# Patient Record
Sex: MALE | Race: WHITE | NOT HISPANIC OR LATINO | Employment: FULL TIME | URBAN - NONMETROPOLITAN AREA
[De-identification: names, ages, dates, MRNs, and addresses within clinical notes are randomized per-mention and may not be internally consistent; named-entity substitution may affect disease eponyms.]

---

## 2023-02-28 PROBLEM — L70.0 ACNE VULGARIS: Status: ACTIVE | Noted: 2023-02-28

## 2023-02-28 PROBLEM — L03.119 CELLULITIS OF LEG: Status: ACTIVE | Noted: 2023-02-28

## 2023-02-28 RX ORDER — DOXYCYCLINE 100 MG/1
1 CAPSULE ORAL DAILY
COMMUNITY
Start: 2012-10-18 | End: 2023-03-22 | Stop reason: ALTCHOICE

## 2023-02-28 RX ORDER — TRETINOIN 0.25 MG/G
CREAM TOPICAL
COMMUNITY
Start: 2012-08-26 | End: 2023-03-22 | Stop reason: ALTCHOICE

## 2023-02-28 RX ORDER — SULFAMETHOXAZOLE AND TRIMETHOPRIM 800; 160 MG/1; MG/1
1 TABLET ORAL 2 TIMES DAILY
COMMUNITY
Start: 2013-06-06 | End: 2023-03-22 | Stop reason: ALTCHOICE

## 2023-03-14 NOTE — PROGRESS NOTES
Subjective   Patient ID:   Calderon Vicente is a 27 y.o. male who presents for New Patient Visit.  [facility]  New patient here today to establish care with myself.  Last PCP: N/A  Last seen: N/A  No significant PMH.  Not currently on medications.    Elevated BP:  BP is a little up today at 139/84.  No history of HTN.  States he is a little nervous.    Varicocele:  Was in the ED back in Feb 2023 for this.  It has since resolved.    Health maintenance:  Smoking: Current smoker.   Labs: DUE  Influenza:    Review of Systems  12 point review of systems negative unless stated above in HPI    Vitals:    03/22/23 0810   BP: 139/84   Pulse: 99   SpO2: 95%       Physical Exam  General: Alert and oriented, well nourished, no acute distress.  Lungs: Clear to auscultation, non-labored respiration.  Heart: Normal rate, regular rhythm, no murmur, gallop or edema.  Neurologic: Awake, alert, and oriented X3, CN II-XII intact.  Psychiatric: Cooperative, appropriate mood and affect.    Assessment/Plan   It was nice meeting you today!  I have ordered some labs to be done as soon as you can.  We will call you with the results.  We will continue to monitor the BP, but it appears to be high due to anxiety today.  Consider checking at home.  Previous Bps reviewed and look good.  Call with questions or concerns.    Fu yearly and as needed  Diagnoses and all orders for this visit:  Elevated blood pressure reading  -     Comprehensive Metabolic Panel; Future  -     Lipid Panel; Future  -     CBC and Auto Differential; Future  Varicocele

## 2023-03-20 ENCOUNTER — APPOINTMENT (OUTPATIENT)
Dept: PRIMARY CARE | Facility: CLINIC | Age: 28
End: 2023-03-20
Payer: COMMERCIAL

## 2023-03-22 ENCOUNTER — OFFICE VISIT (OUTPATIENT)
Dept: PRIMARY CARE | Facility: CLINIC | Age: 28
End: 2023-03-22
Payer: COMMERCIAL

## 2023-03-22 VITALS
HEART RATE: 99 BPM | SYSTOLIC BLOOD PRESSURE: 139 MMHG | BODY MASS INDEX: 37.05 KG/M2 | OXYGEN SATURATION: 95 % | HEIGHT: 70 IN | DIASTOLIC BLOOD PRESSURE: 84 MMHG | WEIGHT: 258.8 LBS

## 2023-03-22 DIAGNOSIS — R03.0 ELEVATED BLOOD PRESSURE READING: Primary | ICD-10-CM

## 2023-03-22 DIAGNOSIS — I86.1 VARICOCELE: ICD-10-CM

## 2023-03-22 PROCEDURE — 99203 OFFICE O/P NEW LOW 30 MIN: CPT | Performed by: PHYSICIAN ASSISTANT

## 2023-04-13 NOTE — PROGRESS NOTES
Subjective   Patient ID:   Calderon Vicente is a 27 y.o. male who presents for Migraine, Fatigue, and Dizziness.  HPI  Elevated BP:  BP was high last visit - he was nervous for the appointment  No history of HTN.  BP today is 137/91.  He does get dizziness and headaches at times.  Does not check at home.    Fatigue:  Having increased tiredness.  Feels light headed/dizzy at times.  No specific trigger.    Varicocele:  Was in the ED back in Feb 2023 for this.  It has since resolved.    Health maintenance:  Smoking: Current smoker.   Labs: DUE  Influenza:    Review of Systems  12 point review of systems negative unless stated above in HPI    Vitals:    04/17/23 1353   BP: (!) 137/91   Pulse: 70   SpO2: 97%       Physical Exam  General: Alert and oriented, well nourished, no acute distress.  Lungs: Clear to auscultation, non-labored respiration.  Heart: Normal rate, regular rhythm, no murmur, gallop or edema.  Neurologic: Awake, alert, and oriented X3, CN II-XII intact.  Psychiatric: Cooperative, appropriate mood and affect.    Assessment/Plan   I have ordered some labs to be done as soon as you can.  We will call you with the results.  These will evaluate causes of fatigue.  Consider a sleep study.  I do believe your dizziness/headaches are related to elevated BP.  I have sent in low dose Lisinopril to start.  Consider monitoring BP at home.  Continue with smoking cessation.  If symptoms persist or worsen despite current plan of care, please contact your healthcare provider for further evaluation.  Patient instructed to contact the office if there are any questions regarding their care or treatment.   [facility] [phone].  [facility] [phone].    Fu 1 month for BP  Diagnoses and all orders for this visit:  Primary hypertension  -     lisinopril 2.5 mg tablet; Take 1 tablet (2.5 mg) by mouth once daily.  Fatigue, unspecified type  -     Comprehensive Metabolic Panel; Future  -      Lipid Panel; Future  -     CBC and Auto Differential; Future  -     Vitamin D 1,25 Dihydroxy; Future  -     Vitamin B12; Future  -     TSH with reflex to Free T4 if abnormal; Future  -     Hemoglobin A1C; Future  Light headed  -     Hemoglobin A1C; Future  Nonintractable episodic headache, unspecified headache type

## 2023-04-17 ENCOUNTER — OFFICE VISIT (OUTPATIENT)
Dept: PRIMARY CARE | Facility: CLINIC | Age: 28
End: 2023-04-17
Payer: COMMERCIAL

## 2023-04-17 VITALS
DIASTOLIC BLOOD PRESSURE: 91 MMHG | BODY MASS INDEX: 37.13 KG/M2 | HEIGHT: 70 IN | HEART RATE: 70 BPM | OXYGEN SATURATION: 97 % | WEIGHT: 259.38 LBS | SYSTOLIC BLOOD PRESSURE: 137 MMHG

## 2023-04-17 DIAGNOSIS — R53.83 FATIGUE, UNSPECIFIED TYPE: ICD-10-CM

## 2023-04-17 DIAGNOSIS — R42 LIGHT HEADED: ICD-10-CM

## 2023-04-17 DIAGNOSIS — R51.9 NONINTRACTABLE EPISODIC HEADACHE, UNSPECIFIED HEADACHE TYPE: ICD-10-CM

## 2023-04-17 DIAGNOSIS — I10 PRIMARY HYPERTENSION: Primary | ICD-10-CM

## 2023-04-17 PROCEDURE — 3075F SYST BP GE 130 - 139MM HG: CPT | Performed by: PHYSICIAN ASSISTANT

## 2023-04-17 PROCEDURE — 3080F DIAST BP >= 90 MM HG: CPT | Performed by: PHYSICIAN ASSISTANT

## 2023-04-17 PROCEDURE — 99214 OFFICE O/P EST MOD 30 MIN: CPT | Performed by: PHYSICIAN ASSISTANT

## 2023-04-17 RX ORDER — LISINOPRIL 2.5 MG/1
2.5 TABLET ORAL DAILY
Qty: 30 TABLET | Refills: 1 | Status: SHIPPED | OUTPATIENT
Start: 2023-04-17 | End: 2023-04-18 | Stop reason: SDUPTHER

## 2023-04-18 ENCOUNTER — LAB (OUTPATIENT)
Dept: LAB | Facility: LAB | Age: 28
End: 2023-04-18
Payer: COMMERCIAL

## 2023-04-18 DIAGNOSIS — R42 LIGHT HEADED: ICD-10-CM

## 2023-04-18 DIAGNOSIS — I10 PRIMARY HYPERTENSION: ICD-10-CM

## 2023-04-18 DIAGNOSIS — R53.83 FATIGUE, UNSPECIFIED TYPE: ICD-10-CM

## 2023-04-18 LAB
ALANINE AMINOTRANSFERASE (SGPT) (U/L) IN SER/PLAS: 41 U/L (ref 10–52)
ALBUMIN (G/DL) IN SER/PLAS: 4.4 G/DL (ref 3.4–5)
ALKALINE PHOSPHATASE (U/L) IN SER/PLAS: 48 U/L (ref 33–120)
ANION GAP IN SER/PLAS: 13 MMOL/L (ref 10–20)
ASPARTATE AMINOTRANSFERASE (SGOT) (U/L) IN SER/PLAS: 25 U/L (ref 9–39)
BASOPHILS (10*3/UL) IN BLOOD BY AUTOMATED COUNT: 0.06 X10E9/L (ref 0–0.1)
BASOPHILS/100 LEUKOCYTES IN BLOOD BY AUTOMATED COUNT: 0.9 % (ref 0–2)
BILIRUBIN TOTAL (MG/DL) IN SER/PLAS: 0.6 MG/DL (ref 0–1.2)
CALCIUM (MG/DL) IN SER/PLAS: 9.6 MG/DL (ref 8.6–10.3)
CARBON DIOXIDE, TOTAL (MMOL/L) IN SER/PLAS: 30 MMOL/L (ref 21–32)
CHLORIDE (MMOL/L) IN SER/PLAS: 101 MMOL/L (ref 98–107)
CHOLESTEROL (MG/DL) IN SER/PLAS: 139 MG/DL (ref 0–199)
CHOLESTEROL IN HDL (MG/DL) IN SER/PLAS: 34.1 MG/DL
CHOLESTEROL/HDL RATIO: 4.1
CREATININE (MG/DL) IN SER/PLAS: 0.87 MG/DL (ref 0.5–1.3)
EOSINOPHILS (10*3/UL) IN BLOOD BY AUTOMATED COUNT: 0.33 X10E9/L (ref 0–0.7)
EOSINOPHILS/100 LEUKOCYTES IN BLOOD BY AUTOMATED COUNT: 4.8 % (ref 0–6)
ERYTHROCYTE DISTRIBUTION WIDTH (RATIO) BY AUTOMATED COUNT: 11.9 % (ref 11.5–14.5)
ERYTHROCYTE MEAN CORPUSCULAR HEMOGLOBIN CONCENTRATION (G/DL) BY AUTOMATED: 34.1 G/DL (ref 32–36)
ERYTHROCYTE MEAN CORPUSCULAR VOLUME (FL) BY AUTOMATED COUNT: 90 FL (ref 80–100)
ERYTHROCYTES (10*6/UL) IN BLOOD BY AUTOMATED COUNT: 5.49 X10E12/L (ref 4.5–5.9)
GFR MALE: >90 ML/MIN/1.73M2
GLUCOSE (MG/DL) IN SER/PLAS: 82 MG/DL (ref 74–99)
HEMATOCRIT (%) IN BLOOD BY AUTOMATED COUNT: 49.5 % (ref 41–52)
HEMOGLOBIN (G/DL) IN BLOOD: 16.9 G/DL (ref 13.5–17.5)
IMMATURE GRANULOCYTES/100 LEUKOCYTES IN BLOOD BY AUTOMATED COUNT: 0.3 % (ref 0–0.9)
LDL: 82 MG/DL (ref 0–99)
LEUKOCYTES (10*3/UL) IN BLOOD BY AUTOMATED COUNT: 6.9 X10E9/L (ref 4.4–11.3)
LYMPHOCYTES (10*3/UL) IN BLOOD BY AUTOMATED COUNT: 2.21 X10E9/L (ref 1.2–4.8)
LYMPHOCYTES/100 LEUKOCYTES IN BLOOD BY AUTOMATED COUNT: 32.1 % (ref 13–44)
MONOCYTES (10*3/UL) IN BLOOD BY AUTOMATED COUNT: 0.71 X10E9/L (ref 0.1–1)
MONOCYTES/100 LEUKOCYTES IN BLOOD BY AUTOMATED COUNT: 10.3 % (ref 2–10)
NEUTROPHILS (10*3/UL) IN BLOOD BY AUTOMATED COUNT: 3.55 X10E9/L (ref 1.2–7.7)
NEUTROPHILS/100 LEUKOCYTES IN BLOOD BY AUTOMATED COUNT: 51.6 % (ref 40–80)
PLATELETS (10*3/UL) IN BLOOD AUTOMATED COUNT: 254 X10E9/L (ref 150–450)
POTASSIUM (MMOL/L) IN SER/PLAS: 4.8 MMOL/L (ref 3.5–5.3)
PROTEIN TOTAL: 7.5 G/DL (ref 6.4–8.2)
SODIUM (MMOL/L) IN SER/PLAS: 139 MMOL/L (ref 136–145)
THYROTROPIN (MIU/L) IN SER/PLAS BY DETECTION LIMIT <= 0.05 MIU/L: 3.19 MIU/L (ref 0.44–3.98)
TRIGLYCERIDE (MG/DL) IN SER/PLAS: 114 MG/DL (ref 0–149)
UREA NITROGEN (MG/DL) IN SER/PLAS: 12 MG/DL (ref 6–23)
VLDL: 23 MG/DL (ref 0–40)

## 2023-04-18 PROCEDURE — 80061 LIPID PANEL: CPT

## 2023-04-18 PROCEDURE — 82607 VITAMIN B-12: CPT

## 2023-04-18 PROCEDURE — 82652 VIT D 1 25-DIHYDROXY: CPT

## 2023-04-18 PROCEDURE — 85025 COMPLETE CBC W/AUTO DIFF WBC: CPT

## 2023-04-18 PROCEDURE — 36415 COLL VENOUS BLD VENIPUNCTURE: CPT

## 2023-04-18 PROCEDURE — 83036 HEMOGLOBIN GLYCOSYLATED A1C: CPT

## 2023-04-18 PROCEDURE — 80053 COMPREHEN METABOLIC PANEL: CPT

## 2023-04-18 PROCEDURE — 84443 ASSAY THYROID STIM HORMONE: CPT

## 2023-04-18 RX ORDER — LISINOPRIL 2.5 MG/1
2.5 TABLET ORAL DAILY
Qty: 90 TABLET | Refills: 0 | Status: SHIPPED | OUTPATIENT
Start: 2023-04-18 | End: 2023-06-12 | Stop reason: SDUPTHER

## 2023-04-19 LAB
COBALAMIN (VITAMIN B12) (PG/ML) IN SER/PLAS: 507 PG/ML (ref 211–911)
ESTIMATED AVERAGE GLUCOSE FOR HBA1C: 88 MG/DL
HEMOGLOBIN A1C/HEMOGLOBIN TOTAL IN BLOOD: 4.7 %

## 2023-04-22 LAB — VITAMIN D 1,25-DIHYDROXY: 34.6 PG/ML (ref 19.9–79.3)

## 2023-05-18 PROBLEM — R03.0 ELEVATED BLOOD PRESSURE READING: Status: RESOLVED | Noted: 2023-03-22 | Resolved: 2023-05-18

## 2023-05-18 NOTE — PROGRESS NOTES
Subjective   Patient ID:   Calderon Vicente is a 27 y.o. male who presents for No chief complaint on file..  HPI  HTN:  We started Lisinopril last visit.  BP today is  He does get dizziness and headaches at times.  Does not check at home.    Fatigue:  Labs were normal in April 2023.  Consider sleep study.  Having increased tiredness.  Feels light headed/dizzy at times.  No specific trigger.    Varicocele:  Was in the ED back in Feb 2023 for this.  It has since resolved.    Health maintenance:  Smoking: Current smoker.   Labs: April 2023.  Influenza:    Review of Systems  12 point review of systems negative unless stated above in HPI    There were no vitals filed for this visit.      Physical Exam  General: Alert and oriented, well nourished, no acute distress.  Lungs: Clear to auscultation, non-labored respiration.  Heart: Normal rate, regular rhythm, no murmur, gallop or edema.  Neurologic: Awake, alert, and oriented X3, CN II-XII intact.  Psychiatric: Cooperative, appropriate mood and affect.    Assessment/Plan   Diagnoses and all orders for this visit:  Primary hypertension  Fatigue, unspecified type  Varicocele

## 2023-05-22 ENCOUNTER — APPOINTMENT (OUTPATIENT)
Dept: PRIMARY CARE | Facility: CLINIC | Age: 28
End: 2023-05-22
Payer: COMMERCIAL

## 2023-05-23 NOTE — PROGRESS NOTES
Subjective   Patient ID:   Calderon Vicente is a 27 y.o. male who presents for No chief complaint on file..  HPI  HTN:  We started Lisinopril last visit.  BP today is  He does get dizziness and headaches at times.  Does not check at home.    Fatigue:  Labs were normal in April 2023.  Consider sleep study.  Having increased tiredness.  Feels light headed/dizzy at times.  No specific trigger.    Varicocele:  Was in the ED back in Feb 2023 for this.  It has since resolved.    Health maintenance:  Smoking: Current smoker.   Labs: April 2023.  Influenza:    Review of Systems  12 point review of systems negative unless stated above in HPI    There were no vitals filed for this visit.      Physical Exam  General: Alert and oriented, well nourished, no acute distress.  Lungs: Clear to auscultation, non-labored respiration.  Heart: Normal rate, regular rhythm, no murmur, gallop or edema.  Neurologic: Awake, alert, and oriented X3, CN II-XII intact.  Psychiatric: Cooperative, appropriate mood and affect.    Assessment/Plan   There are no diagnoses linked to this encounter.

## 2023-05-31 ENCOUNTER — APPOINTMENT (OUTPATIENT)
Dept: PRIMARY CARE | Facility: CLINIC | Age: 28
End: 2023-05-31
Payer: COMMERCIAL

## 2023-05-31 NOTE — PROGRESS NOTES
Subjective   Patient ID:   Calderon Vicente is a 27 y.o. male who presents for Hypertension (Follow-up, feels better than he did last visit).  HPI  HTN:  We started Lisinopril last visit.  BP today is 113/75.  Dizziness and headaches have improved!    Fatigue:  This has improved!  Labs were normal in April 2023.  Consider sleep study.  Feels light headed/dizzy at times.  No specific trigger.    Varicocele:  Was in the ED back in Feb 2023 for this.  It has since resolved.    Health maintenance:  Smoking: Current smoker.   Labs: April 2023.  Influenza:    Review of Systems  12 point review of systems negative unless stated above in HPI    Vitals:    06/12/23 1047   BP: 113/75   Pulse: 60   SpO2: 99%     Physical Exam  General: Alert and oriented, well nourished, no acute distress.  Lungs: Clear to auscultation, non-labored respiration.  Heart: Normal rate, regular rhythm, no murmur, gallop or edema.  Neurologic: Awake, alert, and oriented X3, CN II-XII intact.  Psychiatric: Cooperative, appropriate mood and affect.    Assessment/Plan   I am glad you are doing better!  BP is excellent today!  Continue the same medications.  Chronic conditions are stable.  Call with questions or concerns.    F/u  6 months  Diagnoses and all orders for this visit:  Primary hypertension  Fatigue, unspecified type  Light headed  Nonintractable episodic headache, unspecified headache type  Varicocele

## 2023-06-12 ENCOUNTER — OFFICE VISIT (OUTPATIENT)
Dept: PRIMARY CARE | Facility: CLINIC | Age: 28
End: 2023-06-12
Payer: COMMERCIAL

## 2023-06-12 VITALS
OXYGEN SATURATION: 99 % | HEART RATE: 60 BPM | BODY MASS INDEX: 35.98 KG/M2 | SYSTOLIC BLOOD PRESSURE: 113 MMHG | WEIGHT: 257 LBS | DIASTOLIC BLOOD PRESSURE: 75 MMHG | HEIGHT: 71 IN

## 2023-06-12 DIAGNOSIS — R42 LIGHT HEADED: ICD-10-CM

## 2023-06-12 DIAGNOSIS — I86.1 VARICOCELE: ICD-10-CM

## 2023-06-12 DIAGNOSIS — R51.9 NONINTRACTABLE EPISODIC HEADACHE, UNSPECIFIED HEADACHE TYPE: ICD-10-CM

## 2023-06-12 DIAGNOSIS — R53.83 FATIGUE, UNSPECIFIED TYPE: ICD-10-CM

## 2023-06-12 DIAGNOSIS — I10 PRIMARY HYPERTENSION: Primary | ICD-10-CM

## 2023-06-12 PROCEDURE — 99213 OFFICE O/P EST LOW 20 MIN: CPT | Performed by: PHYSICIAN ASSISTANT

## 2023-06-12 PROCEDURE — 3078F DIAST BP <80 MM HG: CPT | Performed by: PHYSICIAN ASSISTANT

## 2023-06-12 PROCEDURE — 3074F SYST BP LT 130 MM HG: CPT | Performed by: PHYSICIAN ASSISTANT

## 2023-06-12 RX ORDER — LISINOPRIL 2.5 MG/1
2.5 TABLET ORAL DAILY
Qty: 90 TABLET | Refills: 1 | Status: SHIPPED | OUTPATIENT
Start: 2023-06-12 | End: 2024-02-09 | Stop reason: SDUPTHER

## 2023-12-12 ENCOUNTER — APPOINTMENT (OUTPATIENT)
Dept: PRIMARY CARE | Facility: CLINIC | Age: 28
End: 2023-12-12
Payer: COMMERCIAL

## 2023-12-22 ENCOUNTER — APPOINTMENT (OUTPATIENT)
Dept: PRIMARY CARE | Facility: CLINIC | Age: 28
End: 2023-12-22
Payer: COMMERCIAL

## 2024-01-19 ENCOUNTER — APPOINTMENT (OUTPATIENT)
Dept: PRIMARY CARE | Facility: CLINIC | Age: 29
End: 2024-01-19
Payer: COMMERCIAL

## 2024-02-02 ENCOUNTER — APPOINTMENT (OUTPATIENT)
Dept: PRIMARY CARE | Facility: CLINIC | Age: 29
End: 2024-02-02
Payer: COMMERCIAL

## 2024-02-07 PROBLEM — L70.0 ACNE VULGARIS: Status: RESOLVED | Noted: 2023-02-28 | Resolved: 2024-02-07

## 2024-02-07 PROBLEM — R42 LIGHT HEADED: Status: RESOLVED | Noted: 2023-04-17 | Resolved: 2024-02-07

## 2024-02-07 PROBLEM — R51.9 NONINTRACTABLE EPISODIC HEADACHE: Status: RESOLVED | Noted: 2023-04-17 | Resolved: 2024-02-07

## 2024-02-07 PROBLEM — L03.119 CELLULITIS OF LEG: Status: RESOLVED | Noted: 2023-02-28 | Resolved: 2024-02-07

## 2024-02-07 NOTE — PROGRESS NOTES
Patient ID:   Calderon Vicente is a 28 y.o. male with PMH remarkable for HTN, varicocele, who presents to the office today for Follow-up (Follow up - bp /Has been dieting and exercising, last week was dizzy after exercising. Drank a lot of water, felt better).    HEALTH MAINTENANCE: Follow Up  Smoking: Current Smoker  Labs: 4/18/2023  Works 3rd shift.  Has been feeling dizziness/lightheadedness when working out.  Increased water intake and felt improvement.  Has drank pre workout in the past  Drinking Prime for hydration    SOCIAL HISTORY:  Social History     Tobacco Use    Smoking status: Every Day     Packs/day: .5     Types: Cigarettes    Smokeless tobacco: Never   Vaping Use    Vaping Use: Every day    Substances: Nicotine   Substance Use Topics    Alcohol use: Yes    Drug use: Never   Vapes every day    IMMUNIZATIONS:  Immunization History   Administered Date(s) Administered    DTaP / HiB / IPV 1996, 01/06/1997, 03/03/1997    DTaP IPV combined vaccine (KINRIX, QUADRACEL) 02/02/1998, 10/23/2000    HPV, Quadrivalent 05/10/2012, 06/27/2012, 06/06/2013    Hep A, Unspecified 04/26/2012    Hepatitis A vaccine, pediatric/adolescent (HAVRIX, VAQTA) 06/06/2013    Hepatitis B vaccine, adult (RECOMBIVAX, ENGERIX) 1996, 1996, 06/10/1997    HiB PRP-OMP conjugate vaccine, pediatric (PEDVAXHIB) 09/04/1997    Influenza, Unspecified 11/24/2014    Influenza, seasonal, injectable 11/24/2014    MMR vaccine, subcutaneous (MMR II) 09/11/1997, 10/23/2000    Meningococcal MCV4P 04/26/2012    Pneumococcal polysaccharide vaccine, 23-valent, age 2 years and older (PNEUMOVAX 23) 10/23/2000    Tdap vaccine, age 7 year and older (BOOSTRIX, ADACEL) 10/18/2008    Varicella vaccine, subcutaneous (VARIVAX) 09/26/1997, 04/26/2012     REVIEW OF SYSTEMS:  Review of Systems   Neurological:  Positive for dizziness.   All other systems reviewed and are negative.    ALLERGIES:  Allergies   Allergen Reactions    Codeine Unknown and Rash  "     VITAL SIGNS:  Visit Vitals  /78   Pulse 69   Resp 18   Ht 1.803 m (5' 11\")   Wt 110 kg (243 lb)   SpO2 98%   BMI 33.89 kg/m²   Smoking Status Every Day   BSA 2.35 m²    -- lost weight recently since Jan.  Drinks ETOH once in awhile    Physical Exam  Vitals reviewed.   Constitutional:       General: He is not in acute distress.     Appearance: Normal appearance. He is not ill-appearing.   HENT:      Head: Normocephalic and atraumatic.      Right Ear: Tympanic membrane and external ear normal.      Left Ear: Tympanic membrane and external ear normal.      Nose: Nose normal.      Mouth/Throat:      Mouth: Mucous membranes are moist.      Pharynx: Oropharynx is clear.   Eyes:      Conjunctiva/sclera: Conjunctivae normal.      Pupils: Pupils are equal, round, and reactive to light.   Cardiovascular:      Rate and Rhythm: Normal rate and regular rhythm.      Heart sounds: Normal heart sounds. No murmur heard.  Pulmonary:      Effort: Pulmonary effort is normal. No respiratory distress.      Breath sounds: Normal breath sounds. No wheezing.   Abdominal:      General: There is no distension.      Palpations: Abdomen is soft. There is no mass.      Tenderness: There is no abdominal tenderness.   Musculoskeletal:         General: Normal range of motion.      Cervical back: Normal range of motion and neck supple.   Skin:     General: Skin is warm and dry.   Neurological:      General: No focal deficit present.      Mental Status: He is alert and oriented to person, place, and time.      Sensory: No sensory deficit.      Motor: No weakness.      Coordination: Coordination normal.      Gait: Gait normal.   Psychiatric:         Mood and Affect: Mood normal.         Behavior: Behavior normal.       MEDICATIONS:  Current Outpatient Medications on File Prior to Visit   Medication Sig Dispense Refill    lisinopril 2.5 mg tablet Take 1 tablet (2.5 mg) by mouth once daily. 90 tablet 1     No current facility-administered " medications on file prior to visit.      LABORATORY DATA:  Lab Results   Component Value Date    WBC 6.9 04/18/2023    HGB 16.9 04/18/2023    HCT 49.5 04/18/2023     04/18/2023    CHOL 139 04/18/2023    TRIG 114 04/18/2023    HDL 34.1 (A) 04/18/2023    ALT 41 04/18/2023    AST 25 04/18/2023     04/18/2023    K 4.8 04/18/2023     04/18/2023    CREATININE 0.87 04/18/2023    BUN 12 04/18/2023    CO2 30 04/18/2023    TSH 3.19 04/18/2023    HGBA1C 4.7 04/18/2023     ASSESSMENT AND PLAN:  Assessment/Plan   Diagnoses and all orders for this visit:  Fatigue, unspecified type  -     CBC and Auto Differential; Future  -     Comprehensive Metabolic Panel; Future  -     TSH with reflex to Free T4 if abnormal; Future  Health care maintenance  Primary hypertension  Comments:  - c/w lisinopril 2.5mg QD  - monitor BP and HR at home  - advised to buy a BP monitor  Orders:  -     lisinopril 2.5 mg tablet; Take 1 tablet (2.5 mg) by mouth once daily.      --------------------  Written by Radha Dubon RN, acting as a scribe for Dr. Hernandez. This note accurately reflects the work and decisions made by Dr. Hernandez.     I, Dr. Hernandez, attest all medical record entries made by the scribe were under my direction and were personally dictated by me. I have reviewed the chart and agree that the record accurately reflects my performance of the history, physical exam, and assessment and plan.

## 2024-02-09 ENCOUNTER — OFFICE VISIT (OUTPATIENT)
Dept: PRIMARY CARE | Facility: CLINIC | Age: 29
End: 2024-02-09
Payer: COMMERCIAL

## 2024-02-09 VITALS
HEART RATE: 69 BPM | OXYGEN SATURATION: 98 % | WEIGHT: 243 LBS | DIASTOLIC BLOOD PRESSURE: 78 MMHG | RESPIRATION RATE: 18 BRPM | BODY MASS INDEX: 34.02 KG/M2 | SYSTOLIC BLOOD PRESSURE: 119 MMHG | HEIGHT: 71 IN

## 2024-02-09 DIAGNOSIS — R53.83 FATIGUE, UNSPECIFIED TYPE: Primary | ICD-10-CM

## 2024-02-09 DIAGNOSIS — Z00.00 HEALTH CARE MAINTENANCE: ICD-10-CM

## 2024-02-09 DIAGNOSIS — I10 PRIMARY HYPERTENSION: ICD-10-CM

## 2024-02-09 PROCEDURE — 99203 OFFICE O/P NEW LOW 30 MIN: CPT | Performed by: INTERNAL MEDICINE

## 2024-02-09 PROCEDURE — 3074F SYST BP LT 130 MM HG: CPT | Performed by: INTERNAL MEDICINE

## 2024-02-09 PROCEDURE — 3078F DIAST BP <80 MM HG: CPT | Performed by: INTERNAL MEDICINE

## 2024-02-09 PROCEDURE — 4004F PT TOBACCO SCREEN RCVD TLK: CPT | Performed by: INTERNAL MEDICINE

## 2024-02-09 RX ORDER — LISINOPRIL 2.5 MG/1
2.5 TABLET ORAL DAILY
Qty: 90 TABLET | Refills: 1 | Status: SHIPPED | OUTPATIENT
Start: 2024-02-09 | End: 2024-04-22 | Stop reason: SDUPTHER

## 2024-02-09 ASSESSMENT — PATIENT HEALTH QUESTIONNAIRE - PHQ9
2. FEELING DOWN, DEPRESSED OR HOPELESS: NOT AT ALL
SUM OF ALL RESPONSES TO PHQ9 QUESTIONS 1 AND 2: 0
1. LITTLE INTEREST OR PLEASURE IN DOING THINGS: NOT AT ALL

## 2024-02-10 PROBLEM — Z00.00 HEALTH CARE MAINTENANCE: Status: ACTIVE | Noted: 2024-02-10

## 2024-02-10 SDOH — HEALTH STABILITY: PHYSICAL HEALTH: ON AVERAGE, HOW MANY MINUTES DO YOU ENGAGE IN EXERCISE AT THIS LEVEL?: 60 MIN

## 2024-02-10 SDOH — HEALTH STABILITY: PHYSICAL HEALTH: ON AVERAGE, HOW MANY DAYS PER WEEK DO YOU ENGAGE IN MODERATE TO STRENUOUS EXERCISE (LIKE A BRISK WALK)?: 5 DAYS

## 2024-02-10 ASSESSMENT — ENCOUNTER SYMPTOMS: DIZZINESS: 1

## 2024-02-10 NOTE — PATIENT INSTRUCTIONS
It was nice to see you in the office today!  As discussed during our visit...    Please have your blood drawn in the next 1-2 weeks.   You need to be FASTING for 12 hours prior to blood draw.  We will contact you with the results of your blood work and any necessary adjustments to your plan of care.  Iif you do not hear from us within 3-5 days of having your blood drawn, please call the [place] office at [phone].     The Outpatient Lab is no longer in this building and has moved to ...  [facility]  [address] [place] [address]    Hours:   Monday through Friday 7:30am - 4:30pm (Closed 12:30-1pm for lunch)     Or you can go to ...  [place] [id number] [address] [phone]    Hours:   Monday through Friday 7:30am - 4:30pm (Closed 12:30-1pm for lunch)   Saturday 8am - 12pm    Website to confirm hours and location OR look up more locations ... [email or web address]     Increase your water intake. Especially when you are working out and sweating.

## 2024-04-15 ENCOUNTER — APPOINTMENT (OUTPATIENT)
Dept: PRIMARY CARE | Facility: CLINIC | Age: 29
End: 2024-04-15
Payer: COMMERCIAL

## 2024-04-22 ENCOUNTER — OFFICE VISIT (OUTPATIENT)
Dept: PRIMARY CARE | Facility: CLINIC | Age: 29
End: 2024-04-22
Payer: COMMERCIAL

## 2024-04-22 VITALS
DIASTOLIC BLOOD PRESSURE: 84 MMHG | HEIGHT: 70 IN | SYSTOLIC BLOOD PRESSURE: 145 MMHG | WEIGHT: 236 LBS | OXYGEN SATURATION: 98 % | HEART RATE: 79 BPM | BODY MASS INDEX: 33.79 KG/M2

## 2024-04-22 DIAGNOSIS — H61.20 IMPACTED CERUMEN, UNSPECIFIED LATERALITY: ICD-10-CM

## 2024-04-22 DIAGNOSIS — I10 PRIMARY HYPERTENSION: ICD-10-CM

## 2024-04-22 DIAGNOSIS — R51.9 INTRACTABLE HEADACHE, UNSPECIFIED CHRONICITY PATTERN, UNSPECIFIED HEADACHE TYPE: ICD-10-CM

## 2024-04-22 PROCEDURE — 3077F SYST BP >= 140 MM HG: CPT | Performed by: INTERNAL MEDICINE

## 2024-04-22 PROCEDURE — 99213 OFFICE O/P EST LOW 20 MIN: CPT | Performed by: INTERNAL MEDICINE

## 2024-04-22 PROCEDURE — 4004F PT TOBACCO SCREEN RCVD TLK: CPT | Performed by: INTERNAL MEDICINE

## 2024-04-22 PROCEDURE — 3079F DIAST BP 80-89 MM HG: CPT | Performed by: INTERNAL MEDICINE

## 2024-04-22 RX ORDER — LISINOPRIL 2.5 MG/1
2.5 TABLET ORAL DAILY
Qty: 90 TABLET | Refills: 1 | Status: SHIPPED | OUTPATIENT
Start: 2024-04-22

## 2024-04-22 ASSESSMENT — ENCOUNTER SYMPTOMS
HEADACHES: 1
PSYCHIATRIC NEGATIVE: 1
GASTROINTESTINAL NEGATIVE: 1
RESPIRATORY NEGATIVE: 1
LIGHT-HEADEDNESS: 1
CONSTITUTIONAL NEGATIVE: 1
CARDIOVASCULAR NEGATIVE: 1
MUSCULOSKELETAL NEGATIVE: 1

## 2024-04-22 ASSESSMENT — PATIENT HEALTH QUESTIONNAIRE - PHQ9
2. FEELING DOWN, DEPRESSED OR HOPELESS: NOT AT ALL
1. LITTLE INTEREST OR PLEASURE IN DOING THINGS: NOT AT ALL
SUM OF ALL RESPONSES TO PHQ9 QUESTIONS 1 AND 2: 0

## 2024-04-22 ASSESSMENT — PAIN SCALES - GENERAL: PAINLEVEL: 0-NO PAIN

## 2024-04-22 NOTE — PROGRESS NOTES
Patient ID: He states that he started to have cramping in the back of his head about three weeks ago. He states he went to urgent care a couple weeks ago because he was scared about his symptoms, was told his ears and throat were red, advised to take OTC medication. He states that his headache was constant when it started, but it is less frequent now. He states that he has increased his water intake and the pain has improved. He denies any congestion, cough or SOB. He denies any arm numbness or weakness. He states that he has not been to an eye doctor in ten years. He states he has been feeling a little bit lightheaded recently.     Urgent care Follow Up: he states he was seen in urgent care a few weeks ago for pain in back of head    HPI Calderon Vicente is a 28 y.o. male with PMH remarkable for HTN who presents to the office today for Follow-up.    Social History     Tobacco Use    Smoking status: Every Day     Current packs/day: 0.50     Types: Cigarettes    Smokeless tobacco: Never   Vaping Use    Vaping status: Every Day    Substances: Nicotine   Substance Use Topics    Alcohol use: Yes    Drug use: Never       Immunization History   Administered Date(s) Administered    DTaP / HiB / IPV 1996, 01/06/1997, 03/03/1997    DTaP IPV combined vaccine (KINRIX, QUADRACEL) 02/02/1998, 10/23/2000    HPV, Quadrivalent 05/10/2012, 06/27/2012, 06/06/2013    Hep A, Unspecified 04/26/2012    Hepatitis A vaccine, pediatric/adolescent (HAVRIX, VAQTA) 06/06/2013    Hepatitis B vaccine, adult (RECOMBIVAX, ENGERIX) 1996, 1996, 06/10/1997    HiB PRP-OMP conjugate vaccine, pediatric (PEDVAXHIB) 09/04/1997    Influenza, Unspecified 11/24/2014    Influenza, seasonal, injectable 11/24/2014    MMR vaccine, subcutaneous (MMR II) 09/11/1997, 10/23/2000    Meningococcal MCV4P 04/26/2012    Pneumococcal polysaccharide vaccine, 23-valent, age 2 years and older (PNEUMOVAX 23) 10/23/2000    Tdap vaccine, age 7 year and older  "(BOOSTRIX, ADACEL) 10/18/2008    Varicella vaccine, subcutaneous (VARIVAX) 09/26/1997, 04/26/2012     Review of Systems   Constitutional: Negative.    Respiratory: Negative.     Cardiovascular: Negative.    Gastrointestinal: Negative.    Genitourinary: Negative.    Musculoskeletal: Negative.    Neurological:  Positive for light-headedness and headaches.   Psychiatric/Behavioral: Negative.       Visit Vitals  /84 (BP Location: Left arm)   Pulse 79   Ht 1.778 m (5' 10\")   Wt 107 kg (236 lb)   SpO2 98%   BMI 33.86 kg/m²   Smoking Status Every Day   BSA 2.3 m²     Allergies   Allergen Reactions    Codeine Unknown and Rash      Physical Exam  Vitals reviewed.   Constitutional:       Appearance: Normal appearance.   HENT:      Head: Normocephalic and atraumatic.      Right Ear: Tympanic membrane normal. There is impacted cerumen.      Left Ear: Tympanic membrane normal.   Cardiovascular:      Rate and Rhythm: Normal rate and regular rhythm.      Pulses: Normal pulses.      Heart sounds: Normal heart sounds.   Pulmonary:      Effort: Pulmonary effort is normal.      Breath sounds: Normal breath sounds.   Abdominal:      General: Bowel sounds are normal.      Palpations: Abdomen is soft.   Musculoskeletal:         General: Normal range of motion.   Neurological:      General: No focal deficit present.      Mental Status: He is alert and oriented to person, place, and time.   Psychiatric:         Mood and Affect: Mood normal.         Behavior: Behavior normal.         Current Outpatient Medications   Medication Instructions    lisinopril 2.5 mg, oral, Daily        Lab Results   Component Value Date    WBC 6.9 04/18/2023    HGB 16.9 04/18/2023    HCT 49.5 04/18/2023     04/18/2023    CHOL 139 04/18/2023    TRIG 114 04/18/2023    HDL 34.1 (A) 04/18/2023    ALT 41 04/18/2023    AST 25 04/18/2023     04/18/2023    K 4.8 04/18/2023     04/18/2023    CREATININE 0.87 04/18/2023    BUN 12 04/18/2023    CO2 " 30 04/18/2023    TSH 3.19 04/18/2023    HGBA1C 4.7 04/18/2023     Assessment/Plan   Diagnoses and all orders for this visit:  Primary hypertension  -     lisinopril 2.5 mg tablet; Take 1 tablet (2.5 mg) by mouth once daily.    Headaches  - advised to follow up with eye doctor for vision check, scheduled for May 4th   - he does have cerumen buildup in right ear, will irrigate today   - he reports headaches have improved   - advised to follow up or call office if headaches return    --------------------  Written by Candy Grant LPN, acting as a scribe for Dr. Hernandez. This note accurately reflects the work and decisions made by Dr. Hernandez.     I, Dr. Hernandez, attest all medical record entries made by the scribe were under my direction and were personally dictated by me. I have reviewed the chart and agree that the record accurately reflects my performance of the history, physical exam, and assessment and plan.

## 2024-04-23 ENCOUNTER — TELEPHONE (OUTPATIENT)
Dept: PRIMARY CARE | Facility: CLINIC | Age: 29
End: 2024-04-23
Payer: COMMERCIAL

## 2024-04-23 DIAGNOSIS — R51.9 ACUTE NONINTRACTABLE HEADACHE, UNSPECIFIED HEADACHE TYPE: Primary | ICD-10-CM

## 2024-04-23 NOTE — PATIENT INSTRUCTIONS
It was great to see you in the office today! Here is what we discussed at your visit today:  As discussed, call or follow up if your headaches return or get worse  Make sure you keep appointment with your eye doctor

## 2024-05-23 ENCOUNTER — HOSPITAL ENCOUNTER (OUTPATIENT)
Dept: RADIOLOGY | Facility: HOSPITAL | Age: 29
Discharge: HOME | End: 2024-05-23
Payer: COMMERCIAL

## 2024-05-23 DIAGNOSIS — R51.9 ACUTE NONINTRACTABLE HEADACHE, UNSPECIFIED HEADACHE TYPE: ICD-10-CM

## 2024-05-23 PROCEDURE — 70450 CT HEAD/BRAIN W/O DYE: CPT

## 2024-05-23 PROCEDURE — 70450 CT HEAD/BRAIN W/O DYE: CPT | Performed by: RADIOLOGY

## 2024-06-10 ENCOUNTER — APPOINTMENT (OUTPATIENT)
Dept: PRIMARY CARE | Facility: CLINIC | Age: 29
End: 2024-06-10
Payer: COMMERCIAL

## 2025-01-06 ENCOUNTER — APPOINTMENT (OUTPATIENT)
Dept: PRIMARY CARE | Facility: CLINIC | Age: 30
End: 2025-01-06
Payer: COMMERCIAL

## 2025-01-27 ENCOUNTER — APPOINTMENT (OUTPATIENT)
Dept: PRIMARY CARE | Facility: CLINIC | Age: 30
End: 2025-01-27
Payer: COMMERCIAL